# Patient Record
Sex: FEMALE | Race: WHITE | NOT HISPANIC OR LATINO | Employment: FULL TIME | URBAN - METROPOLITAN AREA
[De-identification: names, ages, dates, MRNs, and addresses within clinical notes are randomized per-mention and may not be internally consistent; named-entity substitution may affect disease eponyms.]

---

## 2020-02-24 ENCOUNTER — HOSPITAL ENCOUNTER (EMERGENCY)
Facility: HOSPITAL | Age: 45
Discharge: HOME | End: 2020-02-24
Attending: EMERGENCY MEDICINE

## 2020-02-24 VITALS
HEIGHT: 61 IN | HEART RATE: 78 BPM | DIASTOLIC BLOOD PRESSURE: 78 MMHG | RESPIRATION RATE: 16 BRPM | BODY MASS INDEX: 33.04 KG/M2 | WEIGHT: 175 LBS | TEMPERATURE: 98 F | SYSTOLIC BLOOD PRESSURE: 118 MMHG | OXYGEN SATURATION: 100 %

## 2020-02-24 DIAGNOSIS — T14.8XXA ANIMAL BITE: Primary | ICD-10-CM

## 2020-02-24 PROCEDURE — 25000003 PHARM REV CODE 250: Performed by: EMERGENCY MEDICINE

## 2020-02-24 PROCEDURE — 99283 EMERGENCY DEPT VISIT LOW MDM: CPT

## 2020-02-24 RX ORDER — LIDOCAINE HYDROCHLORIDE 10 MG/ML
1 INJECTION INFILTRATION; PERINEURAL
Status: DISCONTINUED | OUTPATIENT
Start: 2020-02-24 | End: 2020-02-24 | Stop reason: HOSPADM

## 2020-02-24 RX ORDER — LIDOCAINE HYDROCHLORIDE 10 MG/ML
5 INJECTION, SOLUTION EPIDURAL; INFILTRATION; INTRACAUDAL; PERINEURAL
Status: DISCONTINUED | OUTPATIENT
Start: 2020-02-24 | End: 2020-02-24

## 2020-02-24 RX ORDER — SULFAMETHOXAZOLE AND TRIMETHOPRIM 800; 160 MG/1; MG/1
1 TABLET ORAL 2 TIMES DAILY
Qty: 20 TABLET | Refills: 0 | Status: SHIPPED | OUTPATIENT
Start: 2020-02-24 | End: 2020-03-05

## 2020-02-24 RX ORDER — SULFAMETHOXAZOLE AND TRIMETHOPRIM 800; 160 MG/1; MG/1
1 TABLET ORAL
Status: COMPLETED | OUTPATIENT
Start: 2020-02-24 | End: 2020-02-24

## 2020-02-24 RX ORDER — SULFAMETHOXAZOLE AND TRIMETHOPRIM 800; 160 MG/1; MG/1
1 TABLET ORAL 2 TIMES DAILY
Status: DISCONTINUED | OUTPATIENT
Start: 2020-02-24 | End: 2020-02-24

## 2020-02-24 RX ADMIN — SULFAMETHOXAZOLE AND TRIMETHOPRIM 1 TABLET: 800; 160 TABLET ORAL at 08:02

## 2020-02-25 NOTE — ED TRIAGE NOTES
Pt was bitten by a sloth at 1600 at [facility] in [place].  Bite to the right hand.

## 2020-02-25 NOTE — ED PROVIDER NOTES
Encounter Date: 2/24/2020       History     Chief Complaint   Patient presents with    Animal Bite     Geeta Meadows is a 45 y.o female with no sign PMHx. She presents to ED with sloth bite to right thumb    Patient was at and was bitten while interacting with sloth today at  [facility] in [place]    She has 1.3 cm laceration to left posterior aspect of thumb overlying proximal phalanx.      She has 2 superficial abrasions to palmar surface of head overlying 2nd metacarpal    She has full ROM of wrist, hand and fingers    Right hand normal neurovascular status    TD current        Review of patient's allergies indicates:   Allergen Reactions    Amoxicillin     Lamictal [lamotrigine]     Lipitor [atorvastatin]     Pcn [penicillins]      No past medical history on file.  Past Surgical History:   Procedure Laterality Date    APPENDECTOMY      GASTRIC BYPASS      TONSILLECTOMY      TUBAL LIGATION      uterine ablasion       No family history on file.  Social History     Tobacco Use    Smoking status: Current Every Day Smoker     Packs/day: 0.50     Years: 20.00     Pack years: 10.00     Types: Cigarettes   Substance Use Topics    Alcohol use: Not Currently    Drug use: Not on file     Review of Systems   Constitutional: Negative.  Negative for fever.   HENT: Negative.  Negative for sore throat.    Eyes: Negative.    Respiratory: Negative.  Negative for shortness of breath.    Cardiovascular: Negative.  Negative for chest pain.   Gastrointestinal: Negative.  Negative for nausea.   Endocrine: Negative.    Genitourinary: Negative.  Negative for dysuria.   Musculoskeletal: Positive for arthralgias. Negative for back pain.   Skin: Positive for wound. Negative for rash.   Allergic/Immunologic: Negative.    Neurological: Negative.  Negative for weakness.   Hematological: Negative.  Does not bruise/bleed easily.   Psychiatric/Behavioral: Negative.    All other systems reviewed and are negative.      Physical Exam      Initial Vitals [02/24/20 1955]   BP Pulse Resp Temp SpO2   118/78 78 16 98.1 °F (36.7 °C) 100 %      MAP       --         Physical Exam    Nursing note and vitals reviewed.  Constitutional: She appears well-developed and well-nourished.   HENT:   Head: Normocephalic.   Eyes: Conjunctivae are normal.   Neck: Normal range of motion. Neck supple.   Cardiovascular: Normal rate.   Pulmonary/Chest: Breath sounds normal.   Musculoskeletal: She exhibits tenderness.        Right hand: She exhibits tenderness, bony tenderness, laceration and swelling. She exhibits normal range of motion, normal capillary refill and no deformity. Decreased strength noted. She exhibits no finger abduction, no thumb/finger opposition and no wrist extension trouble.        Hands:  Neurological: She is alert and oriented to person, place, and time. GCS score is 15. GCS eye subscore is 4. GCS verbal subscore is 5. GCS motor subscore is 6.   Skin: Skin is warm. Capillary refill takes less than 2 seconds.   Psychiatric: She has a normal mood and affect. Her behavior is normal. Judgment and thought content normal.         ED Course   Procedures  Labs Reviewed - No data to display       Imaging Results    None          Medical Decision Making:   Initial Assessment:   Patient with sloth bite to right thumb    Patient was at and was bitten while interacting with sloth today at  [facility] in [place]    She has 1.3 cm superficial laceration to left posterior aspect of thumb overlying proximal phalanx.      She has 2 superficial abrasions to palmar surface of head overlying 2nd metacarpal    She has full ROM of fingers    Right hand normal neurovascular status    Differential Diagnosis:   Bone injury, foreign body  ED Management:  Wound cleaned and irrigated with copious amounts of saline. Wound explored for foreign body    Patient refused sutures.     Closed with Steri-Strips.      Bactrim given    Discussed physical exam findings with patient  No  acute emergent medical condition identified at this time to warrant further testing/diagnostics  At this time, I believe the patient is clinically stable for discharge.   Patient to follow up with PCP in 1-2 days.  The patient acknowledges that close follow up with a MD is required after all ER visits  Pt given instructions; take all medications prescribed in the ER as directed.   Patient agrees to comply with all instruction and direction given in the ER  Pt agrees to return to ER if any symptoms reoccur                                              Clinical Impression:       ICD-10-CM ICD-9-CM   1. Animal bite T14.8XXA 879.8     E906.5                             Sheree Hernández NP  02/25/20 [id number]

## 2020-02-25 NOTE — DISCHARGE INSTRUCTIONS
Allow Steri-Strips to fall off in 7-10 days    Take antibiotics as prescribed    Monitor for infection    Return to emergency room symptoms worsen    Follow-up primary doctor in 2 days    Motrin for pain as needed

## 2020-02-25 NOTE — ED NOTES
Ann at [facility] dispatch contacted regarding animal bite.  She advised pt make a report in the [place] in [place] that the bite occurred.